# Patient Record
Sex: MALE | Race: WHITE | Employment: OTHER | URBAN - METROPOLITAN AREA
[De-identification: names, ages, dates, MRNs, and addresses within clinical notes are randomized per-mention and may not be internally consistent; named-entity substitution may affect disease eponyms.]

---

## 2021-06-11 ENCOUNTER — APPOINTMENT (OUTPATIENT)
Dept: CT IMAGING | Age: 76
DRG: 063 | End: 2021-06-11
Payer: OTHER GOVERNMENT

## 2021-06-11 ENCOUNTER — HOSPITAL ENCOUNTER (INPATIENT)
Age: 76
LOS: 1 days | Discharge: HOME | DRG: 063 | End: 2021-06-12
Attending: EMERGENCY MEDICINE | Admitting: PSYCHIATRY & NEUROLOGY
Payer: OTHER GOVERNMENT

## 2021-06-11 DIAGNOSIS — I63.9 CEREBROVASCULAR ACCIDENT (CVA), UNSPECIFIED MECHANISM (HCC): ICD-10-CM

## 2021-06-11 DIAGNOSIS — Z92.82 RECEIVED TISSUE PLASMINOGEN ACTIVATOR (T-PA) LESS THAN 24 HOURS PRIOR TO ARRIVAL: Primary | ICD-10-CM

## 2021-06-11 LAB
% CKMB: 2.2 % (ref 0–3.5)
ABSOLUTE EOS #: 0.22 K/UL (ref 0–0.44)
ABSOLUTE IMMATURE GRANULOCYTE: <0.03 K/UL (ref 0–0.3)
ABSOLUTE LYMPH #: 1.13 K/UL (ref 1.1–3.7)
ABSOLUTE MONO #: 0.53 K/UL (ref 0.1–1.2)
ANION GAP SERPL CALCULATED.3IONS-SCNC: 8 MMOL/L (ref 9–17)
BASOPHILS # BLD: 1 % (ref 0–2)
BASOPHILS ABSOLUTE: 0.03 K/UL (ref 0–0.2)
BUN BLDV-MCNC: 19 MG/DL (ref 8–23)
BUN/CREAT BLD: ABNORMAL (ref 9–20)
CALCIUM SERPL-MCNC: 8.6 MG/DL (ref 8.6–10.4)
CHLORIDE BLD-SCNC: 111 MMOL/L (ref 98–107)
CK MB: 2 NG/ML
CKMB INTERPRETATION: ABNORMAL
CO2: 23 MMOL/L (ref 20–31)
CREAT SERPL-MCNC: 0.94 MG/DL (ref 0.7–1.2)
DIFFERENTIAL TYPE: ABNORMAL
EOSINOPHILS RELATIVE PERCENT: 4 % (ref 1–4)
GFR AFRICAN AMERICAN: >60 ML/MIN
GFR NON-AFRICAN AMERICAN: >60 ML/MIN
GFR SERPL CREATININE-BSD FRML MDRD: ABNORMAL ML/MIN/{1.73_M2}
GFR SERPL CREATININE-BSD FRML MDRD: ABNORMAL ML/MIN/{1.73_M2}
GLUCOSE BLD-MCNC: 87 MG/DL (ref 70–99)
HCT VFR BLD CALC: 39.6 % (ref 40.7–50.3)
HEMOGLOBIN: 13 G/DL (ref 13–17)
IMMATURE GRANULOCYTES: 0 %
INR BLD: 1.2
LYMPHOCYTES # BLD: 22 % (ref 24–43)
MCH RBC QN AUTO: 33.1 PG (ref 25.2–33.5)
MCHC RBC AUTO-ENTMCNC: 32.8 G/DL (ref 28.4–34.8)
MCV RBC AUTO: 100.8 FL (ref 82.6–102.9)
MONOCYTES # BLD: 10 % (ref 3–12)
MYOGLOBIN: 46 NG/ML (ref 28–72)
NRBC AUTOMATED: 0 PER 100 WBC
PARTIAL THROMBOPLASTIN TIME: 22.2 SEC (ref 20.5–30.5)
PDW BLD-RTO: 12.8 % (ref 11.8–14.4)
PLATELET # BLD: ABNORMAL K/UL (ref 138–453)
PLATELET ESTIMATE: ABNORMAL
PLATELET, FLUORESCENCE: 117 K/UL (ref 138–453)
PLATELET, IMMATURE FRACTION: 4.9 % (ref 1.1–10.3)
PMV BLD AUTO: ABNORMAL FL (ref 8.1–13.5)
POTASSIUM SERPL-SCNC: 3.6 MMOL/L (ref 3.7–5.3)
PROTHROMBIN TIME: 13 SEC (ref 9.1–12.3)
RBC # BLD: 3.93 M/UL (ref 4.21–5.77)
RBC # BLD: ABNORMAL 10*6/UL
SARS-COV-2, RAPID: NOT DETECTED
SEG NEUTROPHILS: 63 % (ref 36–65)
SEGMENTED NEUTROPHILS ABSOLUTE COUNT: 3.24 K/UL (ref 1.5–8.1)
SODIUM BLD-SCNC: 142 MMOL/L (ref 135–144)
SPECIMEN DESCRIPTION: NORMAL
TOTAL CK: 90 U/L (ref 39–308)
TROPONIN INTERP: ABNORMAL
TROPONIN T: ABNORMAL NG/ML
TROPONIN, HIGH SENSITIVITY: 15 NG/L (ref 0–22)
WBC # BLD: 5.2 K/UL (ref 3.5–11.3)
WBC # BLD: ABNORMAL 10*3/UL

## 2021-06-11 PROCEDURE — 80048 BASIC METABOLIC PNL TOTAL CA: CPT

## 2021-06-11 PROCEDURE — 85055 RETICULATED PLATELET ASSAY: CPT

## 2021-06-11 PROCEDURE — 85027 COMPLETE CBC AUTOMATED: CPT

## 2021-06-11 PROCEDURE — 2580000003 HC RX 258: Performed by: NURSE PRACTITIONER

## 2021-06-11 PROCEDURE — 99283 EMERGENCY DEPT VISIT LOW MDM: CPT

## 2021-06-11 PROCEDURE — 70496 CT ANGIOGRAPHY HEAD: CPT

## 2021-06-11 PROCEDURE — 6370000000 HC RX 637 (ALT 250 FOR IP): Performed by: NURSE PRACTITIONER

## 2021-06-11 PROCEDURE — 84484 ASSAY OF TROPONIN QUANT: CPT

## 2021-06-11 PROCEDURE — 80061 LIPID PANEL: CPT

## 2021-06-11 PROCEDURE — 85730 THROMBOPLASTIN TIME PARTIAL: CPT

## 2021-06-11 PROCEDURE — 70450 CT HEAD/BRAIN W/O DYE: CPT

## 2021-06-11 PROCEDURE — 85025 COMPLETE CBC W/AUTO DIFF WBC: CPT

## 2021-06-11 PROCEDURE — 82565 ASSAY OF CREATININE: CPT

## 2021-06-11 PROCEDURE — 99222 1ST HOSP IP/OBS MODERATE 55: CPT | Performed by: PSYCHIATRY & NEUROLOGY

## 2021-06-11 PROCEDURE — 3E03317 INTRODUCTION OF OTHER THROMBOLYTIC INTO PERIPHERAL VEIN, PERCUTANEOUS APPROACH: ICD-10-PCS | Performed by: PSYCHIATRY & NEUROLOGY

## 2021-06-11 PROCEDURE — 2580000003 HC RX 258: Performed by: STUDENT IN AN ORGANIZED HEALTH CARE EDUCATION/TRAINING PROGRAM

## 2021-06-11 PROCEDURE — 82550 ASSAY OF CK (CPK): CPT

## 2021-06-11 PROCEDURE — 96374 THER/PROPH/DIAG INJ IV PUSH: CPT

## 2021-06-11 PROCEDURE — 2000000003 HC NEURO ICU R&B

## 2021-06-11 PROCEDURE — 83036 HEMOGLOBIN GLYCOSYLATED A1C: CPT

## 2021-06-11 PROCEDURE — 82553 CREATINE MB FRACTION: CPT

## 2021-06-11 PROCEDURE — 93005 ELECTROCARDIOGRAM TRACING: CPT | Performed by: STUDENT IN AN ORGANIZED HEALTH CARE EDUCATION/TRAINING PROGRAM

## 2021-06-11 PROCEDURE — 6360000002 HC RX W HCPCS: Performed by: STUDENT IN AN ORGANIZED HEALTH CARE EDUCATION/TRAINING PROGRAM

## 2021-06-11 PROCEDURE — 87635 SARS-COV-2 COVID-19 AMP PRB: CPT

## 2021-06-11 PROCEDURE — 83874 ASSAY OF MYOGLOBIN: CPT

## 2021-06-11 PROCEDURE — 85610 PROTHROMBIN TIME: CPT

## 2021-06-11 PROCEDURE — 6360000004 HC RX CONTRAST MEDICATION: Performed by: STUDENT IN AN ORGANIZED HEALTH CARE EDUCATION/TRAINING PROGRAM

## 2021-06-11 RX ORDER — ONDANSETRON 2 MG/ML
4 INJECTION INTRAMUSCULAR; INTRAVENOUS EVERY 6 HOURS PRN
Status: DISCONTINUED | OUTPATIENT
Start: 2021-06-11 | End: 2021-06-12 | Stop reason: HOSPADM

## 2021-06-11 RX ORDER — 0.9 % SODIUM CHLORIDE 0.9 %
50 INTRAVENOUS SOLUTION INTRAVENOUS ONCE
Status: COMPLETED | OUTPATIENT
Start: 2021-06-11 | End: 2021-06-11

## 2021-06-11 RX ORDER — ASPIRIN 81 MG/1
81 TABLET, CHEWABLE ORAL DAILY
Status: ON HOLD | COMMUNITY
End: 2021-06-12 | Stop reason: SDUPTHER

## 2021-06-11 RX ORDER — SODIUM CHLORIDE 9 MG/ML
25 INJECTION, SOLUTION INTRAVENOUS PRN
Status: DISCONTINUED | OUTPATIENT
Start: 2021-06-11 | End: 2021-06-12 | Stop reason: HOSPADM

## 2021-06-11 RX ORDER — 0.9 % SODIUM CHLORIDE 0.9 %
1000 INTRAVENOUS SOLUTION INTRAVENOUS ONCE
Status: COMPLETED | OUTPATIENT
Start: 2021-06-11 | End: 2021-06-11

## 2021-06-11 RX ORDER — FINASTERIDE 5 MG/1
5 TABLET, FILM COATED ORAL DAILY
COMMUNITY

## 2021-06-11 RX ORDER — M-VIT,TX,IRON,MINS/CALC/FOLIC 27MG-0.4MG
1 TABLET ORAL DAILY
COMMUNITY

## 2021-06-11 RX ORDER — SODIUM CHLORIDE 9 MG/ML
INJECTION, SOLUTION INTRAVENOUS CONTINUOUS
Status: DISCONTINUED | OUTPATIENT
Start: 2021-06-11 | End: 2021-06-12 | Stop reason: HOSPADM

## 2021-06-11 RX ORDER — ATORVASTATIN CALCIUM 80 MG/1
80 TABLET, FILM COATED ORAL NIGHTLY
Status: DISCONTINUED | OUTPATIENT
Start: 2021-06-11 | End: 2021-06-12 | Stop reason: HOSPADM

## 2021-06-11 RX ORDER — LABETALOL HYDROCHLORIDE 5 MG/ML
10 INJECTION, SOLUTION INTRAVENOUS
Status: DISCONTINUED | OUTPATIENT
Start: 2021-06-11 | End: 2021-06-12 | Stop reason: HOSPADM

## 2021-06-11 RX ORDER — ONDANSETRON 4 MG/1
4 TABLET, ORALLY DISINTEGRATING ORAL EVERY 8 HOURS PRN
Status: DISCONTINUED | OUTPATIENT
Start: 2021-06-11 | End: 2021-06-12 | Stop reason: HOSPADM

## 2021-06-11 RX ORDER — TAMSULOSIN HYDROCHLORIDE 0.4 MG/1
0.4 CAPSULE ORAL 2 TIMES DAILY
COMMUNITY

## 2021-06-11 RX ADMIN — SODIUM CHLORIDE: 9 INJECTION, SOLUTION INTRAVENOUS at 23:34

## 2021-06-11 RX ADMIN — ATORVASTATIN CALCIUM 80 MG: 80 TABLET, FILM COATED ORAL at 23:34

## 2021-06-11 RX ADMIN — SODIUM CHLORIDE 1000 ML: 9 INJECTION, SOLUTION INTRAVENOUS at 16:58

## 2021-06-11 RX ADMIN — SODIUM CHLORIDE 50 ML: 0.9 INJECTION, SOLUTION INTRAVENOUS at 18:30

## 2021-06-11 RX ADMIN — SODIUM CHLORIDE 1000 ML: 9 INJECTION, SOLUTION INTRAVENOUS at 18:10

## 2021-06-11 RX ADMIN — ALTEPLASE 8.4 MG: KIT at 16:56

## 2021-06-11 RX ADMIN — ALTEPLASE 75.8 MG: KIT at 16:57

## 2021-06-11 RX ADMIN — IOPAMIDOL 90 ML: 755 INJECTION, SOLUTION INTRAVENOUS at 17:28

## 2021-06-11 ASSESSMENT — ENCOUNTER SYMPTOMS
ABDOMINAL PAIN: 0
DIARRHEA: 0
SHORTNESS OF BREATH: 0
NAUSEA: 0
COUGH: 0
BACK PAIN: 0
VOMITING: 0
CONSTIPATION: 0

## 2021-06-11 NOTE — ED NOTES
The following labs labeled with pt sticker and tubed:     [x] COVID-19 swab    [x] Rapid       Pt tolerated well. NAD noted.                Indio John RN  06/11/21 1910

## 2021-06-11 NOTE — ED PROVIDER NOTES
Ran Out of Food in the Last Year:    Transportation Needs:     Lack of Transportation (Medical):  Lack of Transportation (Non-Medical):    Physical Activity:     Days of Exercise per Week:     Minutes of Exercise per Session:    Stress:     Feeling of Stress :    Social Connections:     Frequency of Communication with Friends and Family:     Frequency of Social Gatherings with Friends and Family:     Attends Christianity Services:     Active Member of Clubs or Organizations:     Attends Club or Organization Meetings:     Marital Status:    Intimate Partner Violence:     Fear of Current or Ex-Partner:     Emotionally Abused:     Physically Abused:     Sexually Abused:        No family history on file. Allergies:  Tramadol    Home Medications:  Prior to Admission medications    Medication Sig Start Date End Date Taking? Authorizing Provider   tamsulosin (FLOMAX) 0.4 MG capsule Take 0.4 mg by mouth 2 times daily   Yes Historical Provider, MD       REVIEW OFSYSTEMS    (2-9 systems for level 4, 10 or more for level 5)      Review of Systems   Constitutional: Negative for activity change, appetite change, chills, fatigue and fever. HENT: Negative for congestion. Eyes: Negative for visual disturbance. Respiratory: Negative for cough and shortness of breath. Cardiovascular: Negative for chest pain and leg swelling. Gastrointestinal: Negative for abdominal pain, constipation, diarrhea, nausea and vomiting. Musculoskeletal: Negative for back pain. Skin: Negative for rash and wound. Neurological: Positive for speech difficulty. Negative for dizziness, syncope, facial asymmetry, weakness, light-headedness, numbness and headaches.        PHYSICAL EXAM   (up to 7 for level 4, 8 or more forlevel 5)      INITIAL VITALS:   ED Triage Vitals [06/11/21 1718]   BP Temp Temp src Pulse Resp SpO2 Height Weight   124/80 -- -- 76 18 93 % -- 206 lb (93.4 kg)     Vitals:    06/11/21 1718 06/11/21 1731 MDM/Plan/ED COURSE:    77 y.o. male who presents with complaints of dysarthria, aphasia and reported ataxia. Patient arrived via mobile stroke with stroke team at bedside, with TPA running. Patient is well-appearing, nontoxic. Vital signs are within normal limits except for satting 93 to 95% on room air but no acute respiratory distress. Cardiac regular rate and rhythm. Lungs clear to auscultation bilaterally. No drift in upper and lower extremities. Normal sensation. Pupils equal, round, reactive to light, extraocular muscles intact, vision grossly intact. Patient does have dysarthria and expressive aphasia on exam.  No facial droop. No lower extremity swelling or calf tenderness. CT head per mobile stroke unremarkable. Plan for CTA head and neck, stroke panel, EKG and admission to the neuro ICU. Patient admitted to neuro ICU. DIAGNOSTIC RESULTS / EMERGENCYDEPARTMENT COURSE / MDM     LABS:  Labs Reviewed   STROKE PANEL - Abnormal; Notable for the following components:       Result Value    RBC 3.93 (*)     Hematocrit 39.6 (*)     Lymphocytes 22 (*)     Protime 13.0 (*)     All other components within normal limits   IMMATURE PLATELET FRACTION - Abnormal; Notable for the following components:    Platelet, Fluorescence 117 (*)     All other components within normal limits           CT HEAD WO CONTRAST    Result Date: 6/11/2021  EXAMINATION: CT OF THE HEAD WITHOUT CONTRAST  6/11/2021 4:27 pm TECHNIQUE: CT of the head was performed without the administration of intravenous contrast. Dose modulation, iterative reconstruction, and/or weight based adjustment of the mA/kV was utilized to reduce the radiation dose to as low as reasonably achievable. COMPARISON: None.  HISTORY: ORDERING SYSTEM PROVIDED HISTORY: Stroke TECHNOLOGIST PROVIDED HISTORY: Stroke Decision Support Exception - unselect if not a suspected or confirmed emergency medical condition->Emergency Medical Condition (MA) FINDINGS:

## 2021-06-11 NOTE — H&P
Neuro ICU History & Physical    Patient Name: Ryland Isaac  Patient : 1945  Room/Bed:   Allergies: Allergies   Allergen Reactions    Tramadol      \"makes me itch\"     Problem List:   Patient Active Problem List   Diagnosis    Acute ischemic stroke ([facility] [address]       CHIEF COMPLAINT     Dysarthria     HPI    History Obtained From: patient, medical chart and medical staff     The patient is a 77 y.o. male with PMH of HTN, aortic valve replacement, on Metoprolol and ASA 81 mg presented with speech difficulty. Was seen by tele stroke team, the pt stated that he started to have acute onset slurred speech and difficulty in getting the words out. LKW 4 pm. CTH was dose and was neg for acute changes, it showed remote R occipital stroke. SBP was 130. Glucose 168. At bedside, the pt is alert oriented and vitally stable with . Neurological exam reveled expressive aphasia with mild dysarthria with NIHSS of3, STAT CTA  H&N with contrast was unremarkable for LVO. He denied tingling or numbness, weakness, seizures, loss of consciousness, dizziness, headaches, chest or abdomen pain, change in bowel urinary habits, or hallucination. The pt will be admitted to NICU for post tPA precautions. NIH Stroke Scale Total (if not done complete detailed one below):    1a.  Level of consciousness:  0 - alert; keenly responsive  1b. Level of consciousness questions:  0 - answers both questions correctly  1c. Level of consciousness questions:  0 - performs both tasks correctly  2. Best Gaze:  0 - normal  3. Visual:  0 - no visual loss  4. Facial Palsy:  0 - normal symmetric movement  5a. Motor left arm:  0 - no drift, limb holds 90 (or 45) degrees for full 10 seconds  5b. Motor right arm:  0 - no drift, limb holds 90 (or 45) degrees for full 10 seconds  6a. Motor left le - no drift; leg holds 30 degree position for full 5 seconds  6b.   Motor right le - no drift; leg holds 30 degree position for full 5 seconds  7. Limb Ataxia:  0 - absent  8. Sensory:  0 - normal; no sensory loss  9. Best Language:  1 - mild to moderate aphasia; some obvious loss of fluency or facility of comprehension without significant limitation on ideas expressed or form of expression. Reduction of speech and/or comprehension, however, makes conversation about provided materials difficult or impossible. For example, in conversation about provided materials, examiner can identify picture or naming card content from patient's response. 10.  Dysarthria:  1 - mild to moderate, patient slurs at least some words and at worst, can be understood with some difficulty  11. Extinction and Inattention:  0 - no abnormality    Total NIHSS of 2. Admitted to ICU From: ED14  Reason for ICU Admission: post tPA        PATIENT HISTORY   Past Medical History:    No past medical history on file. Past Surgical History:    No past surgical history on file. Social History:   Social History     Socioeconomic History    Marital status: Not on file     Spouse name: Not on file    Number of children: Not on file    Years of education: Not on file    Highest education level: Not on file   Occupational History    Not on file   Tobacco Use    Smoking status: Not on file   Substance and Sexual Activity    Alcohol use: Not on file    Drug use: Not on file    Sexual activity: Not on file   Other Topics Concern    Not on file   Social History Narrative    Not on file     Social Determinants of Health     Financial Resource Strain:     Difficulty of Paying Living Expenses:    Food Insecurity:     Worried About Running Out of Food in the Last Year:     [address] in the Last Year:    Transportation Needs:     Lack of Transportation (Medical):      Lack of Transportation (Non-Medical):    Physical Activity:     Days of Exercise per Week:     Minutes of Exercise per Session:    Stress:     Feeling of Stress :    Social Connections:     Frequency of Communication with Friends and Family:     Frequency of Social Gatherings with Friends and Family:     Attends Mandaeism Services:     Active Member of Clubs or Organizations:     Attends Club or Organization Meetings:     Marital Status:    Intimate Partner Violence:     Fear of Current or Ex-Partner:     Emotionally Abused:     Physically Abused:     Sexually Abused:        Family History:   No family history on file. Allergies:    Tramadol    Medications Prior to Admission:    Not in a hospital admission. Current Medications:  No current facility-administered medications for this encounter.     REVIEW OF SYSTEMS     CONSTITUTIONAL: negative for fatigue and malaise   EYES: negative for double vision and photophobia    HEENT: negative for tinnitus and sore throat   RESPIRATORY: negative for cough, shortness of breath   CARDIOVASCULAR: negative for chest pain, palpitations, or syncope   GASTROINTESTINAL: negative for abdominal pain, nausea, vomiting, diarrhea, or constipation    GENITOURINARY: negative for incontinence or retention    MUSCULOSKELETAL: negative for neck or back pain, negative for extremity pain   NEUROLOGICAL: Slurred speech    PSYCHIATRIC: negative for agitation, hallucination, SI/HI   SKIN Negative for spontaneous contusions, rashes, or lesions      PHYSICAL EXAM:     /80   Pulse 76   Resp 18   Wt 206 lb (93.4 kg)   SpO2 93%     NEUROLOGIC EXAMINATION  GENERAL  Appears comfortable and in no distress   HEENT  NC/ AT   cardiovascular  S1 and S2 heard; palpation of pulses: radial pulse    NECK  Supple and no bruits heard   MENTAL STATUS:  Alert, oriented, intact memory, no confusion, expressive aphasia, mild dysarthria, no hallucination or delusion   CRANIAL NERVES: II     -      PERRL  Visual fields intact to confrontation  III,IV,VI -  EOMs full, no afferent defect, no LORIN, no ptosis  V     -     Normal facial sensation   VII    -     Normal facial symmetry  VIII   -     Intact hearing   IX,X -     Symmetrical palate  XI    -     Symmetrical shoulder shrug  XII   -     Midline tongue, no atrophy    MOTOR FUNCTION:  significant for good strength of grade 5/5 in bilateral proximal and distal muscle groups of both upper and lower extremities with normal bulk, normal tone and no involuntary movements, no tremor   SENSORY FUNCTION:  Normal touch, normal pin, normal vibration, normal proprioception   CEREBELLAR FUNCTION:  Intact fine motor control over upper limbs   REFLEX FUNCTION:  Symmetric, no perverted reflex, no Babinski sign   STATION and GAIT  Not tested         LABS AND IMAGING:     RECENT LABS:  CBC with Differential:  No results found for: WBC, RBC, HGB, HCT, PLT, MCV, MCH, MCHC, RDW, NRBC, SEGSPCT, BANDSPCT, BLASTSPCT, METASPCT, LYMPHOPCT, PROMYELOPCT, MONOPCT, MYELOPCT, EOSPCT, BASOPCT, MONOSABS, LYMPHSABS, EOSABS, BASOSABS, DIFFTYPE  BMP:  No results found for: NA, K, CL, CO2, BUN, LABALBU, CREATININE, CALCIUM, GFRAA, LABGLOM, GLUCOSE    RADIOLOGY:   CT HEAD WO CONTRAST    Result Date: 6/11/2021  EXAMINATION: CT OF THE HEAD WITHOUT CONTRAST  6/11/2021 4:27 pm TECHNIQUE: CT of the head was performed without the administration of intravenous contrast. Dose modulation, iterative reconstruction, and/or weight based adjustment of the mA/kV was utilized to reduce the radiation dose to as low as reasonably achievable. COMPARISON: None. HISTORY: ORDERING SYSTEM PROVIDED HISTORY: Stroke TECHNOLOGIST PROVIDED HISTORY: Stroke Decision Support Exception - unselect if not a suspected or confirmed emergency medical condition->Emergency Medical Condition (MA) FINDINGS: BRAIN/VENTRICLES: There is no acute intracranial hemorrhage, mass effect, or midline shift. There is satisfactory overall gray-white matter differentiation. There is a remote right occipital lobe infarct. The ventricular structures are symmetric and unremarkable.   The infratentorial structures are unremarkable. ORBITS: The visualized portion of the orbits demonstrate no acute abnormality. SINUSES: The visualized paranasal sinuses and mastoid air cells demonstrate no acute abnormality. SOFT TISSUES/SKULL:  No acute abnormality of the visualized skull or soft tissues. No definite acute intracranial abnormality. Remote right occipital lobe infarct. Findings were discussed with TL PARK at 4:44 pm on 6/11/2021. Labs and Images reviewed with:    [] Stephani Jimenez MD    [] Parul Ogden MD  [] Noemí Jones MD  --[] there are no new interval images to review. ASSESSMENT AND PLAN:         ASSESSMENT:     The patient is a 77 y.o. male with PMH of HTN, on Metoprolol and ASA 81 mg presented with dysarthria s/p tPA. Patient care will be discussed with attending, will reevaluate patient along with attending. PLAN/MEDICAL DECISION MAKING:    NEUROLOGIC:  - Imaging CTH neg for acute changes, it showed remote R occipital stroke. -CTA was unremarkable for LVO. - AEDs none   - Goal SBP < 180  - Neuro checks per protocol  -repeat CTH after 24 hours  -MRI of the brain  -Start ASA/Plavix if aaron bleed on images. CARDIOVASCULAR:  - Goal SBP <180  - Continue telemetry  - Echo  - Labetalol 5 mg PRN    PULMONARY:  - Sat well on RA    RENAL/FLUID/ELECTROLYTE:  - KFT wnl  - Monitor Urine output   - IVF: 75 cc/hr  - Replace electrolytes PRN  - Daily BMP    GI/NUTRITION:  NUTRITION:  No diet orders on file  - Bowel regimen: milk of Mag  - GI prophylaxis: none    ID:  - No temp  - WBC wnl  - Continue to monitor for fevers  - Daily CBC    HEME:   - Hemoglobin is 13  - Platelets 683  - Daily CBC    ENDOCRINE:  - Continue to monitor blood glucose, goal <180      OTHER:  - PT/OT/ST   - Code Status: full code     PROPHYLAXIS:  Stress ulcer: none     DVT PROPHYLAXIS:  - SCD sleeves - Thigh High   - CRISTELA stockings - Thigh High  - No chemoprophylaxis anticoagulation at this time.

## 2021-06-11 NOTE — VIRTUAL HEALTH
[address] [facility],4Th Floor Stroke and Vascular Neurology Consult for  [facility] Stroke Unit Stroke Alert through [address] @ 5 pm  6/11/2021 5:07 PM  Pt Name: Higinio Pinto  MRN: [id number]  YOB: 1945  Date of evaluation: 6/11/2021  Primary Care Physician: No primary care provider on file. Reason for Evaluation: Stroke Evaluation with Discussion with Ed or primary team with Telemedicine and stroke evaluation with Review of imaging and labs    Higinio Pinto is a 77 y.o. male who had acute onset speech difficulty/dysarthria. He takes aspirin at home. This started around 4 PM.  No reported falls. His glucose at 168. He was seen at [facility]. He had expressive aphasia anasarca noted. No weakness. No falls. He is not on Coumadin. LKW: 4 PM on 6/11  NIH:  2    Allergies  has no allergies on file. Medications  Prior to Admission medications    Not on File    Scheduled Meds:  Continuous Infusions:  PRN Meds:.  Past Medical History   has no past medical history on file. Social History  Social History     Socioeconomic History    Marital status: Not on file     Spouse name: Not on file    Number of children: Not on file    Years of education: Not on file    Highest education level: Not on file   Occupational History    Not on file   Tobacco Use    Smoking status: Not on file   Substance and Sexual Activity    Alcohol use: Not on file    Drug use: Not on file    Sexual activity: Not on file   Other Topics Concern    Not on file   Social History Narrative    Not on file     Social Determinants of Health     Financial Resource Strain:     Difficulty of Paying Living Expenses:    Food Insecurity:     Worried About Running Out of Food in the Last Year:     [address] in the Last Year:    Transportation Needs:     Lack of Transportation (Medical):      Lack of Transportation (Non-Medical):    Physical Activity:     Days of Exercise per Week:     Minutes of Exercise per Session:    Stress:     Feeling of Stress :    Social Connections:     Frequency of Communication with Friends and Family:     Frequency of Social Gatherings with Friends and Family:     Attends Adventism Services:     Active Member of Clubs or Organizations:     Attends Club or Organization Meetings:     Marital Status:    Intimate Partner Violence:     Fear of Current or Ex-Partner:     Emotionally Abused:     Physically Abused:     Sexually Abused:      Family History  No family history on file. OBJECTIVE  There were no vitals taken for this visit. Imaging:  Images were personally reviewed with VIZ. AI and PACS used to review images including:  CT brain without contrast: No acute bleeding noted. CTA imaging: Pending. Assessment    Differential DDx:  1. Acute ischemic stroke      Recommendations:  1. NIH 2  2. Recommend Inpatient Neurology Consult for further assessment and evaluation   3. Obtain CTA head and neck  4. IV fluids  5. Give TPA    Patient is an IV-tPA candidate:    No history of recent bleeding, no history of brain bleed/aneurysm/brain tumor, or recent surgery/trauma. IV-tPA Consent: I have informed the patient and/or family of all the associated risks including 6% of sich/death and 1-3% of angioedema, benefits, and alternatives to IV t-PA. The patient and/or family voluntarily consent to the administration of IV t-PA. Infuse 0.9 mg/kg (maximum dose 90 mg) over 60 minutes, with 10% of the dose given as a bolus over 1 minute. --Admit the patient to an intensive care or stroke unit for monitoring. --If the patient develops severe headache, acute hypertension, nausea, or vomiting or has a worsening neurological examination, discontinue the infusion (if IV rtPA is being administered) and obtain emergent CT scan.    --Measure blood pressure and perform neurological assessments every 15 minutes during and after IV rtPA infusion for 2 hours, then every 30 minutes for 6 hours, then hourly until 24 hours after IV rtPA treatment. --Increase the frequency of blood pressure measurements if systolic blood pressure is >180 mmHg or if diastolic blood pressure is >105 mmHg; administer antihypertensive medications to maintain blood pressure at or below these levels  --Delay placement of nasogastric tubes, indwelling bladder catheters, or intra-arterial pressure catheters if the patient can be safely managed without them. --Obtain a follow-up CT or MRI scan at 24 hours after IV rtPA before starting anticoagulants or antiplatelet agents. No antiplatelet agent or SQ heparinoids for 24 hrs. --Keep HOB < 15 degrees  --Keep NPO unless passes bedside dysphagia screen or swallow evaluation. --Obtain MRI brain w/o contrast, MRA or CTA of head and neck (carotid u/s if MRA and CTA not available), transthoracic echo-to r/o structural heart disease that can lead to source of emboli, fasting lipid panel, HgbA1c.   --Smoking cessation education and nicotine patch if indicated  --IVF with NS @ 100cc per hour  --PT/OT/SLP      Discussed with Stroke team    At least 20 min of Telemedicine and time in conversation directly with ED staff and physician for the patient who is in imminent and life threatening deterioration without further treatment and evaluation. This Virtual Visit was conducted with patient's (and/or legal guardian's) consent, to provide telestroke consultation and necessary medical care.   Time spent examining patient, reviewing the images personally, reviewing the chart, perform high complexity decision making and speaking with the nursing staff regarding recommendations    Erna Bravo MD, MD   Stroke, Neurocritical Care And/or [address] [place] [address] Stroke [address]  Electronically signed 6/11/2021 at 5:07 PM

## 2021-06-11 NOTE — ED NOTES
..    6/11/2021 6:17 PM    Patient: Stew Simpson, 77 y.o., male Race: White  Patient Address: No address on file. Incident Address:  []Same as patient address     [x] Rosalbajennifer  [] [facility]  [] SOURAV   [] Luanne  [] [facility]  Patient Phone #: There are no phone numbers on file. Insurance: Payor: /   Worker's Comp:  []  Yes   [x]  No  Emergency Contact: No emergency contact information on file. MRN: [id number]  [facility]# [id number]  YOB: 1945  Primary Care Physician: No primary care provider on file. Advance Directive: [x] Full Code   []DNR-CC   []DNR-CCA    MSU Crew: HUGO Paulino - [address] - RN    Pt Transported To:  [x] [place] [address]  [] [facility]  [] [facility]  [] [facility]   [] [facility]  [] [facility]  [facility] [] [facility] [] [facility]    Was patient transported to closest facility? [x]Yes  []No (if No specify reason)   []   Patient/family request    []   Divert to specialist       Response Code   [] 2  [x] 3  []   Change  [] 2  [] 3   Transport Code   [x] 2  [] 3  []   Change  [] 2  [] 3     Mileage:  [facility] 43.   Total Miles 6.9     Call Received 6/11/2021 1602   Dispatched 6/11/2021 1605   Enroute 6/11/2021 1609   Arrived Scene 6/11/2021 1620   At Patient 6/11/2021 1622   Decision to Scan 6/11/2021 1623   Scan 6/11/2021 1629   Departed Scene 6/11/2021 [address] [facility] [address] 6/11/2021 1711   Departed Hospital 6/11/2021 1740   In Service 6/11/2021 1740         Allergies  [] Updated/Reviewed by MSU  [x] Historical Data Only  [] Unavailable  is allergic to tramadol. Medications  [] Updated/Reviewed by MSU  [x] Historical Data Only  [] Unavailable  Prior to Admission medications    Medication Sig Start Date End Date Taking?  Authorizing Provider   tamsulosin (FLOMAX) 0.4 MG capsule Take 0.4 mg by mouth 2 times daily   Yes Historical Provider, MD   aspirin 81 MG chewable tablet Take 81 mg by mouth daily   Yes Historical Provider, MD   Multiple Vitamins-Minerals (THERAPEUTIC MULTIVITAMIN-MINERALS) tablet Take 1 tablet by mouth daily   Yes Historical Provider, MD   metoprolol tartrate (LOPRESSOR) 25 MG tablet Take 37.5 mg by mouth 2 times daily   Yes Historical Provider, MD   finasteride (PROSCAR) 5 MG tablet Take 5 mg by mouth daily   Yes Historical Provider, MD      Past Medical History  [] Updated/Reviewed by MSU  [x] Historical Data Only  [] Unavailable   has no past medical history on file. Patient Weight: 206 lb   []   Estimated   [x]   Stated (per patient)          VITALS          Time BP Pulse   Resp  Rate N-normal  S-shallow  D-deep Monitor SpO2 O2    LPM BGL   Temp Pain   1630 133/74 82 24 N NSR 95 0 168 37 0   1640 116/71 81 22 N NSR 96 0      1650 129/77 73 23 N NSR 95 0      1656 122/79 84 16 N NSR 95 0      1711 131/77 76 16 N NSR 93 0         Pupils GCS   Time R L E  4 V  5 M  6 T  15   1635 2 2 4 5 6 15   1656 2 2 4 5 6 15   1711 2 2 4 5 6 15                         NIH -   record on all transports and Q15 min after tPA administration    NIHSS       Time 1630 1656 1711    1a. LOC - Arousal Status 0 0 0    1b. LOC - Questions 0 0 0    1c. LOC - Commands 0 0 0    2. Eye Movements (gaze) 0 0 0    3. Visual Fields 0 0 0    4. Facial Palsy 0 0 0    5a. Motor Left Arm 0 0 0    5b. Motor Right Arm 0 0 0    6a. Motor Left Leg 0 0 0    6b. Motor Right Leg 0 0 0    7. Limb Ataxia 0 0 0    8. Sensory 0 0 0    9. Language/Aphasia 1 1 1    10. Dysarthria 1 1 1    11. Neglect 0 0 0    TOTAL 2 2 2        Research:    RACE Score: 1 Time: 8728   StrokeVAN Score: Negative Time:1630    Time Last Known Well: 1600 6/11/21    Narrative:    Dispatched to possible CVA per [facility]. Arrived to find [facility], 77 y.o., male c/o new onset difficulty speaking. Report received from [facility] and [place] [facility] at patients side.  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 Upon LS #3 and E19 first assessment, patient was ataxic while getting out of his truck and had slurred speech. First responders also noted that patient had positive orthostatic vitals. Upon MSU arrival, patient was sitting on the back of engine truck. Patient reported that his speech was not normal for himself. Decision was made to scan. Patient was placed in the back of MSU and a full assessment was performed. Patient noted to have slurred speech and at times had difficulty getting his words out, which per patient, is not normal for him. No focal deficits noted with patient arms or legs. No ataxia, sensory or visual disturbances noted. Patient reports he does not take any blood thinners, and provided staff with a medication list. Nishi Anaya at patient side via telemedicine unit. Dr. Nixon Harris also performed full assessment. Due to patient continuing to have trouble with his speech, the decision to give tPA was made. tPA was mixed by Kandis Soliz and checked by [employer]. Bolus dose of 8.4 mg pushed by Nathanael at [id number], drip hung by [address] [place] [address] at 7243. Dr. Nixon Harris also requested 1 liter bolus to be started for patient. Patient received the following medications prior to MSU arrival:none  Patient has the following lines prior to MSU arrival: none  Patient has the following airway placed prior to MSU arrival:None    Patient was transferred to cot via 2 person assist and secured with straps x3. Zoll ECG, SpO2, and NIBP applied and monitored throughout encounter. HOB maintained at 30 degrees. Patient was transferred to ambulance, cot secured in scan position. Non contrast CT scan performed. After scan cot secured in transport position. IV tPA inclusion/exclusion criteria reviewed with patient and physician. Candidate for IV tPA therapy?   [x] Yes   [] No - due to the following exclusion criteria:    [] ICH   [] Taking anticoagulant - oriented x4, no focal deficits noted to the arms or legs. Patient noted to have slurred speech and mild aphasia. Resp: lungs clear to auscultation bilaterally, normal effort  Cardiac: regular rate, no murmur, 12 lead ECG shows NSR  Muscular/skeletal/peripheral vascular: no edema, no redness or tenderness in the calves, pulses present in 4 extremities. Abd/GI/: abd flat, soft, non tender. Skin: Pale, Diaphoretic. IV LINES   Time Size Site # Attempts Performed By   1640 18 R AC 1 HARRY De La Paz   1655 20 L AC 1 HUGO Mcmanus              Total Amount of IV Fluids Infused: 200    MEDS / ELECTRICAL RX   Time Therapy Dosage Route Response Performed By   165 NSR 1,000 mL IV N/A HARRY De La Paz, Medic                                               TPA Administration   Time Bolus Dose Infusion Dose   1656 8.4    1657  75.8       [x] tPA dosing double checked by:  Christopher Parekh Paramedic        ACUTE STROKE DYSPHAGIA SCREEN              YES NO   1 GCS less than 13?         2 Facial Asymmetry or Weakness? 3 Tongue Asymmetry or Weakness? 4 Palatal Asymmetry or Weakness? 5 Signs of aspiration during 3oz water test?*                 If answers to questions 1-4 are NO proceed to 3oz water test               *Administer 3oz of water for sequential drinks, note any throat clearing, cough, or change in vocal quality immediately after and 1 minute following the swallow.                If answers to questions 1-5 are NO proceed with ordered PO meds       POC LABS      TIME      Test (reference range)      FSBG () 168     PT (11-13.5)      INR (0.8-1.1)      Na (138-146)      K (3.5-4.9)      Cl ()      iCa (1.2-1.32)      TCO2 (24-29)      Glu ()      BUN (8.0-26)      Crea (0.6-1.3) 1.2     Hct (38-51)      Hb (12.0-17)      AnGap 10.0-20)                Assistance:   [x]    Fire - LS 3, G54    []    Police    []    Other EMS (See Below)          Hospital Notification:    Christian Castorena 15 -  [x] Delfin Pennington 83  [] [facility] [] TT  [] [email or web address]]  [] [facility] [] [facility] [] [facility] [] [facility]  [] [employer]     [x]    Med Channel:     []    Cell Phone     Med Control Orders Received:   [x] No  []  Yes:     Med Control Physician (if on line medical direction received)  -        Hospital Team Alert:   []    Trauma Alert    []    STEMI Alert         []    Stroke Alert    [x]    RACE Alert      Description Of Valuables: Lamount Abhijit, x2 hearing aides, head set, keys, hat.   Patient Valuables:   [x]    With Patient    []    Not Received    [x]    ER / Lab     Call Outcome:   [x]    Transport to Facility    []    Care Transferred to [facility]    []    Cancelled    []    Patient Refusal    []                           Mobile Stroke Unit    Electronically signed by Ernesto Burnett, RN on 6/11/21 at 6:17 PM EDT     Ernesto Burnett RN  06/11/21 4766

## 2021-06-11 NOTE — ED NOTES
Pt. To the ED via mobile stroke after experiencing stroke like symptoms. Pt. Was at work when he started to experience difficulty w/ speech and states that he just doesn't feel right. Upon arrival pt. Is A&Ox4, resp. Even and non-labored. Slurred speech and expressive aphasia present. Pt. Placed on full cardiac monitor, Dr. Sal Durham at bedside to assess.          Aditi Echeverria RN  06/11/21 3649

## 2021-06-11 NOTE — ED NOTES
Bed: 14  Expected date:   Expected time:   Means of arrival:   Comments:     Elba March RN  06/11/21 2765

## 2021-06-11 NOTE — CONSULTS
abnormality  Total: 2       PAST MEDICAL HISTORY :       Past Medical History:    No past medical history on file. Past Surgical History:    No past surgical history on file. Social History:   Social History     Socioeconomic History    Marital status: Not on file     Spouse name: Not on file    Number of children: Not on file    Years of education: Not on file    Highest education level: Not on file   Occupational History    Not on file   Tobacco Use    Smoking status: Not on file   Substance and Sexual Activity    Alcohol use: Not on file    Drug use: Not on file    Sexual activity: Not on file   Other Topics Concern    Not on file   Social History Narrative    Not on file     Social Determinants of Health     Financial Resource Strain:     Difficulty of Paying Living Expenses:    Food Insecurity:     Worried About Running Out of Food in the Last Year:     [address] in the Last Year:    Transportation Needs:     Lack of Transportation (Medical):  Lack of Transportation (Non-Medical):    Physical Activity:     Days of Exercise per Week:     Minutes of Exercise per Session:    Stress:     Feeling of Stress :    Social Connections:     Frequency of Communication with Friends and Family:     Frequency of Social Gatherings with Friends and Family:     Attends Catholic Services:     Active Member of Clubs or Organizations:     Attends Club or Organization Meetings:     Marital Status:    Intimate Partner Violence:     Fear of Current or Ex-Partner:     Emotionally Abused:     Physically Abused:     Sexually Abused:        Family History:   No family history on file. Allergies:  Tramadol    Home Medications:  Prior to Admission medications    Medication Sig Start Date End Date Taking?  Authorizing Provider   tamsulosin (FLOMAX) 0.4 MG capsule Take 0.4 mg by mouth 2 times daily   Yes Historical Provider, MD   aspirin 81 MG chewable tablet Take 81 mg by mouth daily   Yes Historical Provider, MD   Multiple Vitamins-Minerals (THERAPEUTIC MULTIVITAMIN-MINERALS) tablet Take 1 tablet by mouth daily   Yes Historical Provider, MD   metoprolol tartrate (LOPRESSOR) 25 MG tablet Take 37.5 mg by mouth 2 times daily   Yes Historical Provider, MD   finasteride (PROSCAR) 5 MG tablet Take 5 mg by mouth daily   Yes Historical Provider, MD       Current Medications:   Current Facility-Administered Medications: 0.9 % sodium chloride bolus, 1,000 mL, Intravenous, Once    REVIEW OF SYSTEMS:       CONSTITUTIONAL: negative for fatigue and malaise   EYES: negative for double vision and photophobia    HEENT: negative for tinnitus and sore throat   RESPIRATORY: negative for cough, shortness of breath   CARDIOVASCULAR: negative for chest pain, palpitations   GASTROINTESTINAL: negative for nausea, vomiting   GENITOURINARY: negative for incontinence   MUSCULOSKELETAL: negative for neck or back pain   NEUROLOGICAL: negative for seizures   PSYCHIATRIC: negative for fatigue     Review of systems otherwise negative. PHYSICAL EXAM:       BP (!) 143/82   Pulse 71   Temp 98.3 °F (36.8 °C) (Oral)   Resp 12   Wt 206 lb (93.4 kg)   SpO2 95%     Physical Exam  Vitals and nursing note reviewed. Constitutional:       General: He is not in acute distress. Appearance: He is well-developed. He is not diaphoretic. HENT:      Head: Normocephalic and atraumatic. Right Ear: External ear normal.      Left Ear: External ear normal.   Eyes:      General: No scleral icterus. Right eye: No discharge. Left eye: No discharge. Conjunctiva/sclera: Conjunctivae normal.      Pupils: Pupils are equal, round, and reactive to light. Pulmonary:      Effort: Pulmonary effort is normal.   Abdominal:      General: There is no distension. Palpations: Abdomen is soft. Tenderness: There is no abdominal tenderness. There is no guarding. Musculoskeletal:         General: No tenderness or deformity. Normal range of motion. Cervical back: Normal range of motion. Skin:     General: Skin is warm and dry. Capillary Refill: Capillary refill takes less than 2 seconds. Findings: No erythema or rash. Neurological:      Mental Status: He is alert and oriented to person, place, and time. GCS: GCS eye subscore is 4. GCS verbal subscore is 5. GCS motor subscore is 6. Cranial Nerves: No cranial nerve deficit. Sensory: No sensory deficit. Motor: No abnormal muscle tone or seizure activity. Coordination: Coordination normal.      Deep Tendon Reflexes: Reflexes are normal and symmetric. Reflexes normal.      Reflex Scores:       Tricep reflexes are 2+ on the right side and 2+ on the left side. Bicep reflexes are 2+ on the right side and 2+ on the left side. Brachioradialis reflexes are 2+ on the right side and 2+ on the left side. Patellar reflexes are 2+ on the right side and 2+ on the left side. Achilles reflexes are 2+ on the right side and 2+ on the left side.   Psychiatric:         Behavior: Behavior normal.       Modified [place] Score Scale:     [x] Zero: No symptoms at all   [] 1: No significant disability despite symptoms; able to carry out all usual duties and activities   [] 2: Slight disability; unable to carry out all previous activities, but able to look after own affairs without assistance   [] 3:Moderate disability; requiring some help, but able to walk without assistance   [] 4: Moderately severe disability; unable to walk and attend to bodily needs without assistance   [] 5:Severe disability; bedridden, incontinent and requiring constant nursing care and attention    LABS AND IMAGING:     CBC with Differential:    Lab Results   Component Value Date    WBC 5.2 06/11/2021    RBC 3.93 06/11/2021    HGB 13.0 06/11/2021    HCT 39.6 06/11/2021    PLT See Reflexed IPF Result 06/11/2021    .8 06/11/2021    MCH 33.1 06/11/2021    MCHC 32.8 Lipitor 40mg nightly     - Fasting Lipid panel    - PT, OT, Speech eval     - Hydrate with 1000cc bolus then IVF NS @ 75cc/hr. - Telemetry     - Neuro checks per protocol   - We recommend SBP <180   - Blood glucose goal less than 180   - Please avoid dextrose containing solutions    Additional recommendations may follow    Please contact EV NSG with any changes in patients neurologic status. Thank you for your consult.        Josselin Holman MD, [facility]  PGY-3 Neurology   6/11/2021 at 6:10 PM

## 2021-06-12 ENCOUNTER — APPOINTMENT (OUTPATIENT)
Dept: CT IMAGING | Age: 76
DRG: 063 | End: 2021-06-12
Payer: OTHER GOVERNMENT

## 2021-06-12 ENCOUNTER — APPOINTMENT (OUTPATIENT)
Dept: MRI IMAGING | Age: 76
DRG: 063 | End: 2021-06-12
Payer: OTHER GOVERNMENT

## 2021-06-12 VITALS
OXYGEN SATURATION: 95 % | SYSTOLIC BLOOD PRESSURE: 153 MMHG | TEMPERATURE: 98 F | HEIGHT: 72 IN | HEART RATE: 65 BPM | DIASTOLIC BLOOD PRESSURE: 90 MMHG | RESPIRATION RATE: 20 BRPM | BODY MASS INDEX: 28.5 KG/M2 | WEIGHT: 210.4 LBS

## 2021-06-12 LAB
ANION GAP SERPL CALCULATED.3IONS-SCNC: 7 MMOL/L (ref 9–17)
BUN BLDV-MCNC: 15 MG/DL (ref 8–23)
BUN/CREAT BLD: ABNORMAL (ref 9–20)
CALCIUM SERPL-MCNC: 7.7 MG/DL (ref 8.6–10.4)
CHLORIDE BLD-SCNC: 112 MMOL/L (ref 98–107)
CHOLESTEROL/HDL RATIO: 3.4
CHOLESTEROL: 151 MG/DL
CO2: 22 MMOL/L (ref 20–31)
CREAT SERPL-MCNC: 0.84 MG/DL (ref 0.7–1.2)
EKG ATRIAL RATE: 75 BPM
EKG P AXIS: 45 DEGREES
EKG P-R INTERVAL: 170 MS
EKG Q-T INTERVAL: 382 MS
EKG QRS DURATION: 80 MS
EKG QTC CALCULATION (BAZETT): 426 MS
EKG R AXIS: -12 DEGREES
EKG T AXIS: 46 DEGREES
EKG VENTRICULAR RATE: 75 BPM
GFR AFRICAN AMERICAN: >60 ML/MIN
GFR NON-AFRICAN AMERICAN: >60 ML/MIN
GFR SERPL CREATININE-BSD FRML MDRD: ABNORMAL ML/MIN/{1.73_M2}
GFR SERPL CREATININE-BSD FRML MDRD: ABNORMAL ML/MIN/{1.73_M2}
GLUCOSE BLD-MCNC: 83 MG/DL (ref 70–99)
HCT VFR BLD CALC: 39.3 % (ref 40.7–50.3)
HDLC SERPL-MCNC: 44 MG/DL
HEMOGLOBIN: 12.7 G/DL (ref 13–17)
LDL CHOLESTEROL: 93 MG/DL (ref 0–130)
LV EF: 60 %
LVEF MODALITY: NORMAL
MCH RBC QN AUTO: 33.2 PG (ref 25.2–33.5)
MCHC RBC AUTO-ENTMCNC: 32.3 G/DL (ref 28.4–34.8)
MCV RBC AUTO: 102.6 FL (ref 82.6–102.9)
MRSA, DNA, NASAL: NORMAL
NRBC AUTOMATED: 0 PER 100 WBC
PDW BLD-RTO: 13 % (ref 11.8–14.4)
PLATELET # BLD: ABNORMAL K/UL (ref 138–453)
PLATELET, FLUORESCENCE: 107 K/UL (ref 138–453)
PLATELET, IMMATURE FRACTION: 4.7 % (ref 1.1–10.3)
PMV BLD AUTO: ABNORMAL FL (ref 8.1–13.5)
POTASSIUM SERPL-SCNC: 3.7 MMOL/L (ref 3.7–5.3)
RBC # BLD: 3.83 M/UL (ref 4.21–5.77)
SODIUM BLD-SCNC: 141 MMOL/L (ref 135–144)
SPECIMEN DESCRIPTION: NORMAL
TRIGL SERPL-MCNC: 69 MG/DL
VLDLC SERPL CALC-MCNC: NORMAL MG/DL (ref 1–30)
WBC # BLD: 5.6 K/UL (ref 3.5–11.3)

## 2021-06-12 PROCEDURE — 6360000002 HC RX W HCPCS: Performed by: STUDENT IN AN ORGANIZED HEALTH CARE EDUCATION/TRAINING PROGRAM

## 2021-06-12 PROCEDURE — 93306 TTE W/DOPPLER COMPLETE: CPT

## 2021-06-12 PROCEDURE — 87641 MR-STAPH DNA AMP PROBE: CPT

## 2021-06-12 PROCEDURE — 70450 CT HEAD/BRAIN W/O DYE: CPT

## 2021-06-12 PROCEDURE — 2580000003 HC RX 258: Performed by: NURSE PRACTITIONER

## 2021-06-12 PROCEDURE — 85055 RETICULATED PLATELET ASSAY: CPT

## 2021-06-12 PROCEDURE — 92523 SPEECH SOUND LANG COMPREHEN: CPT

## 2021-06-12 PROCEDURE — 36415 COLL VENOUS BLD VENIPUNCTURE: CPT

## 2021-06-12 PROCEDURE — 97116 GAIT TRAINING THERAPY: CPT

## 2021-06-12 PROCEDURE — 99291 CRITICAL CARE FIRST HOUR: CPT | Performed by: PSYCHIATRY & NEUROLOGY

## 2021-06-12 PROCEDURE — 93010 ELECTROCARDIOGRAM REPORT: CPT | Performed by: INTERNAL MEDICINE

## 2021-06-12 PROCEDURE — 97112 NEUROMUSCULAR REEDUCATION: CPT

## 2021-06-12 PROCEDURE — 70551 MRI BRAIN STEM W/O DYE: CPT

## 2021-06-12 PROCEDURE — 97161 PT EVAL LOW COMPLEX 20 MIN: CPT

## 2021-06-12 RX ORDER — SODIUM CHLORIDE 0.9 % (FLUSH) 0.9 %
5-40 SYRINGE (ML) INJECTION PRN
Status: DISCONTINUED | OUTPATIENT
Start: 2021-06-12 | End: 2021-06-12 | Stop reason: HOSPADM

## 2021-06-12 RX ORDER — CLOPIDOGREL BISULFATE 75 MG/1
75 TABLET ORAL DAILY
Qty: 30 TABLET | Refills: 3 | Status: SHIPPED | OUTPATIENT
Start: 2021-06-12 | End: 2021-06-12 | Stop reason: SDUPTHER

## 2021-06-12 RX ORDER — CLOPIDOGREL BISULFATE 75 MG/1
75 TABLET ORAL DAILY
Qty: 30 TABLET | Refills: 3 | Status: SHIPPED | OUTPATIENT
Start: 2021-06-12

## 2021-06-12 RX ORDER — ASPIRIN 81 MG/1
81 TABLET, CHEWABLE ORAL DAILY
Qty: 30 TABLET | Refills: 0 | Status: SHIPPED | OUTPATIENT
Start: 2021-06-12

## 2021-06-12 RX ORDER — ATORVASTATIN CALCIUM 80 MG/1
80 TABLET, FILM COATED ORAL NIGHTLY
Qty: 30 TABLET | Refills: 3 | Status: SHIPPED | OUTPATIENT
Start: 2021-06-12 | End: 2021-06-12

## 2021-06-12 RX ORDER — ATORVASTATIN CALCIUM 80 MG/1
80 TABLET, FILM COATED ORAL NIGHTLY
Qty: 30 TABLET | Refills: 3 | Status: SHIPPED | OUTPATIENT
Start: 2021-06-12

## 2021-06-12 RX ORDER — SODIUM CHLORIDE 0.9 % (FLUSH) 0.9 %
5-40 SYRINGE (ML) INJECTION EVERY 12 HOURS SCHEDULED
Status: DISCONTINUED | OUTPATIENT
Start: 2021-06-12 | End: 2021-06-12 | Stop reason: HOSPADM

## 2021-06-12 RX ORDER — ASPIRIN 81 MG/1
81 TABLET, CHEWABLE ORAL DAILY
Qty: 30 TABLET | Refills: 0 | Status: SHIPPED | OUTPATIENT
Start: 2021-06-12 | End: 2021-06-12 | Stop reason: SDUPTHER

## 2021-06-12 RX ADMIN — SODIUM CHLORIDE, PRESERVATIVE FREE 10 ML: 5 INJECTION INTRAVENOUS at 11:48

## 2021-06-12 RX ADMIN — CALCIUM GLUCONATE 2000 MG: 20 INJECTION, SOLUTION INTRAVENOUS at 11:44

## 2021-06-12 ASSESSMENT — PAIN SCALES - GENERAL: PAINLEVEL_OUTOF10: 0

## 2021-06-12 NOTE — ED NOTES
Patient's family will be provided Room 7 at [facility] once patient is taken to room.       JANNIE Farris  06/12/21 6083

## 2021-06-12 NOTE — PROGRESS NOTES
Neuro ICU progress note    Patient Name: Tigre Lopes  Patient : 1945  Room/Bed: 0526/0526-01  Allergies: Allergies   Allergen Reactions    Tramadol      \"makes me itch\"     Problem List:   Patient Active Problem List   Diagnosis    Acute ischemic stroke ([facility] [address]       CHIEF COMPLAINT     Dysarthria     HPI    History Obtained From: patient, medical chart and medical staff     The patient is a 77 y.o. male with PMH of HTN, aortic valve replacement, on Metoprolol and ASA 81 mg presented with speech difficulty. Was seen by tele stroke team, the pt stated that he started to have acute onset slurred speech and difficulty in getting the words out. LKW 4 pm. CTH was dose and was neg for acute changes, it showed remote R occipital stroke. SBP was 130. Glucose 168. At bedside, the pt is alert oriented and vitally stable with . Neurological exam reveled expressive aphasia with mild dysarthria with NIHSS of3, STAT CTA  H&N with contrast was unremarkable for LVO. He denied tingling or numbness, weakness, seizures, loss of consciousness, dizziness, headaches, chest or abdomen pain, change in bowel urinary habits, or hallucination. The pt will be admitted to NICU for post tPA precautions. Total NIHSS of 2. Last 24 hours, no acute events overnight, systolic blood pressure ranging between 140 and 160. The patient stated that he had mild improvement in his speech. Pending MRI of the brain and stroke work-up. PATIENT HISTORY   Past Medical History:    No past medical history on file. Past Surgical History:    No past surgical history on file.     Social History:   Social History     Socioeconomic History    Marital status:      Spouse name: Not on file    Number of children: Not on file    Years of education: Not on file    Highest education level: Not on file   Occupational History    Not on file   Tobacco Use    Smoking status: Not on file   Substance and Sexual Activity    Alcohol use: Not on file    Drug use: Not on file    Sexual activity: Not on file   Other Topics Concern    Not on file   Social History Narrative    Not on file     Social Determinants of Health     Financial Resource Strain:     Difficulty of Paying Living Expenses:    Food Insecurity:     Worried About Running Out of Food in the Last Year:     [address] in the Last Year:    Transportation Needs:     Lack of Transportation (Medical):  Lack of Transportation (Non-Medical):    Physical Activity:     Days of Exercise per Week:     Minutes of Exercise per Session:    Stress:     Feeling of Stress :    Social Connections:     Frequency of Communication with Friends and Family:     Frequency of Social Gatherings with Friends and Family:     Attends Hindu Services:     Active Member of Clubs or Organizations:     Attends Club or Organization Meetings:     Marital Status:    Intimate Partner Violence:     Fear of Current or Ex-Partner:     Emotionally Abused:     Physically Abused:     Sexually Abused:        Family History:   No family history on file.     Allergies:    Tramadol    Medications Prior to Admission:    Medications Prior to Admission: tamsulosin (FLOMAX) 0.4 MG capsule, Take 0.4 mg by mouth 2 times daily  aspirin 81 MG chewable tablet, Take 81 mg by mouth daily  Multiple Vitamins-Minerals (THERAPEUTIC MULTIVITAMIN-MINERALS) tablet, Take 1 tablet by mouth daily  metoprolol tartrate (LOPRESSOR) 25 MG tablet, Take 37.5 mg by mouth 2 times daily  finasteride (PROSCAR) 5 MG tablet, Take 5 mg by mouth daily    Current Medications:  Current Facility-Administered Medications: sodium chloride flush 0.9 % injection 5-40 mL, 5-40 mL, Intravenous, 2 times per day  sodium chloride flush 0.9 % injection 5-40 mL, 5-40 mL, Intravenous, PRN  0.9 % sodium chloride infusion, 25 mL, Intravenous, PRN  ondansetron (ZOFRAN-ODT) disintegrating tablet 4 mg, 4 mg, Oral, Q8H PRN **OR** ondansetron Condition (MA) FINDINGS: BRAIN/VENTRICLES: There is no acute intracranial hemorrhage, mass effect, or midline shift. There is satisfactory overall gray-white matter differentiation. There is a remote right occipital lobe infarct. The ventricular structures are symmetric and unremarkable. The infratentorial structures are unremarkable. ORBITS: The visualized portion of the orbits demonstrate no acute abnormality. SINUSES: The visualized paranasal sinuses and mastoid air cells demonstrate no acute abnormality. SOFT TISSUES/SKULL:  No acute abnormality of the visualized skull or soft tissues. No definite acute intracranial abnormality. Remote right occipital lobe infarct. Findings were discussed with TL PARK at 4:44 pm on 6/11/2021. Labs and Images reviewed with:    [] Stephani Holden MD    [] Johnna Paz MD  [] Mathilda Bence, MD  --[] there are no new interval images to review. ASSESSMENT AND PLAN:         ASSESSMENT:     The patient is a 77 y.o. male with PMH of HTN, on Metoprolol and ASA 81 mg presented with dysarthria s/p tPA. Patient care will be discussed with attending, will reevaluate patient along with attending. PLAN/MEDICAL DECISION MAKING:    NEUROLOGIC:  - Imaging CTH neg for acute changes, it showed remote R occipital stroke. -CTA was unremarkable for LVO. - AEDs none   - Goal SBP < 180  - Neuro checks per protocol  -Repeat CTH after 24 hours  -MRI of the brain is negative for acute stroke.  -Start ASA/Plavix if neg bleed on images. CARDIOVASCULAR:  - Goal SBP <180  - Continue telemetry  - Echo pending.  - Labetalol 5 mg PRN    PULMONARY:  - Sat well on RA    RENAL/FLUID/ELECTROLYTE:  - KFT wnl  - Monitor Urine output   - IVF: 100 cc/hr  - Replace electrolytes PRN  - Daily BMP    GI/NUTRITION:  NUTRITION:  ADULT DIET;  Regular  - Bowel regimen: milk of Mag  - GI prophylaxis: none    ID:  - No temp  - WBC wnl  - Continue to monitor for fevers  - Daily CBC HEME:   - Hemoglobin is 12.7  - Platelets 609  - Daily CBC    ENDOCRINE:  - Continue to monitor blood glucose, goal <180      OTHER:  - PT/OT/ST   - Code Status: full code     PROPHYLAXIS:  Stress ulcer: none     DVT PROPHYLAXIS:  - SCD sleeves - Thigh High   - CRISTELA stockings - Thigh High  - No chemoprophylaxis anticoagulation at this time.       DISPOSITION: Magali Rios MD  Neuro Critical Care Service   6/12/2021     7:49 AM

## 2021-06-12 NOTE — FLOWSHEET NOTE
[address] [facility] [address]     Emergency/Trauma Note    PATIENT NAME: Kristina Tucker    Shift date: 6/11/21  Shift day: Friday   Shift # 3    Room # 14/14   Name: Kristina Tucker            Age: 77 y.o. Gender: male          Jehovah's witness: No Sabianism on file   Place of Orthodoxy:     Trauma/Incident type: Stroke Alert  Admit Date & Time: 6/11/2021  5:15 PM  TRAUMA NAME:     ADVANCE DIRECTIVES IN CHART? No    NAME OF DECISION MAKER:     RELATIONSHIP OF DECISION MAKER TO PATIENT:     PATIENT/EVENT DESCRIPTION:  Kristina Tucker is a 77 y.o. male left handed who presents with acute dysarthria around 4 PM today while he was working on connecting his trailer to his vehicle? EMS called and [facility] responding found to have NIH 2 for expressive aphasia and dysarthria. Pt to be admitted to 14/14. SPIRITUAL ASSESSMENT/INTERVENTION:   responded to Stroke Alert. Pt sitting up in bed and talking with slurred speech. Pt requested wife to be called and brother.  completed request and escorted to pt's room.  was bedside support offering prayer and compassionate care during ED Stay. Gratitude was expressed. PATIENT BELONGINGS:  With patient    ANY BELONGINGS OF SIGNIFICANT VALUE NOTED:  N/A    REGISTRATION STAFF NOTIFIED?   Yes    WHAT IS YOUR SPIRITUAL CARE PLAN FOR THIS PATIENT?:  Chaplains are available 24/7 via [employer]       06/11/21 8894   Encounter Summary   Services provided to: Patient   Referral/Consult From: Multi-disciplinary team   Continue Visiting   (6/11/21)   Complexity of Encounter Moderate   Length of Encounter 45 minutes   Spiritual Assessment Completed Yes   Crisis   Type Stroke Alert   Assessment Approachable   Intervention Explored feelings, thoughts, concerns   Outcome Comfort       Electronically signed by William Rodriguez on 6/11/2021 at 11:39 PM.  [address]  [phone]

## 2021-06-12 NOTE — DISCHARGE SUMMARY
[facility]    Discharge Summary     Patient ID: Kenan Fofana  :  1945   MRN: [id number]     ACCOUNT:  [de-identified]   Patient's PCP: No primary care provider on file. Admit Date: 2021   Discharge Date: 2021   Length of Stay: 1  Code Status:  Full Code  Admitting Physician: Deisi Benjamin MD  Discharge Physician: Carmel Carter MD     Active Discharge Diagnoses:       Primary Problem  Expressive Omari Dawnjonathans 373 Problems    Diagnosis Date Noted    Acute ischemic stroke [facility]) [I63.9] 2021       Admission Condition:  fair     Discharged Condition: good    Hospital Stay:       Hospital Course:  Kenan Fofana is a 77 y.o. male who was admitted for the management of   <principal problem not specified> , presented to ER with Cerebrovascular Accident    The patient is a 77 y.o. male with PMH of HTN, aortic valve replacement, on Metoprolol and ASA 81 mg presented with speech difficulty. Was seen by tele stroke team, the pt stated that he started to have acute onset slurred speech and difficulty in getting the words out. LKW 4 pm. CTH was dose and was neg for acute changes, it showed remote R occipital stroke. SBP was 130. Glucose 168. At bedside, the pt is alert oriented and vitally stable with . Neurological exam reveled expressive aphasia with mild dysarthria with NIHSS of3, STAT CTA  H&N with contrast was unremarkable for LVO. He denied tingling or numbness, weakness, seizures, loss of consciousness, dizziness, headaches, chest or abdomen pain, change in bowel urinary habits, or hallucination. The pt was admitted to NICU for post tPA precautions. Total NIHSS of 2.     Last 24 hours, no acute events overnight, systolic blood pressure ranging between 140 and 160. The patient stated that he had mild improvement in his speech.      LDL 93, hemoglobin A1c was pending  Echo showed EF of 60%  MRI of the brain was negative for acute stroke. CTH 24hrs post tPA is negative for bleed    The patient will be discharged on aspirin and Plavix for 21 days then continue Plavix as monotherapy. Start on Lipitor 80 mg. To follow-up with neurology clinic in 2 months, with PCP in 1 week for blood pressure management. Patient is stable from neurological standpoint to be discharged.   Significant therapeutic interventions:   tPA  Lipitor  Aspirin/Plavix    Significant Diagnostic Studies:   Labs / Micro:  CBC:   Lab Results   Component Value Date    WBC 5.6 06/12/2021    RBC 3.83 06/12/2021    HGB 12.7 06/12/2021    HCT 39.3 06/12/2021    .6 06/12/2021    MCH 33.2 06/12/2021    MCHC 32.3 06/12/2021    RDW 13.0 06/12/2021    PLT See Reflexed IPF Result 06/12/2021     BMP:    Lab Results   Component Value Date    GLUCOSE 83 06/12/2021     06/12/2021    K 3.7 06/12/2021     06/12/2021    CO2 22 06/12/2021    ANIONGAP 7 06/12/2021    BUN 15 06/12/2021    CREATININE 0.84 06/12/2021    BUNCRER NOT REPORTED 06/12/2021    CALCIUM 7.7 06/12/2021    LABGLOM >60 06/12/2021    GFRAA >60 06/12/2021    GFR      06/12/2021    GFR NOT REPORTED 06/12/2021     HFP:  No results found for: ALB, PROT  CMP:    Lab Results   Component Value Date    GLUCOSE 83 06/12/2021     06/12/2021    K 3.7 06/12/2021     06/12/2021    CO2 22 06/12/2021    BUN 15 06/12/2021    CREATININE 0.84 06/12/2021    ANIONGAP 7 06/12/2021    LABGLOM >60 06/12/2021    GFRAA >60 06/12/2021    GFR      06/12/2021    GFR NOT REPORTED 06/12/2021    CALCIUM 7.7 06/12/2021     PT/INR:    Lab Results   Component Value Date    PROTIME 13.0 06/11/2021    INR 1.2 06/11/2021     PTT:   Lab Results   Component Value Date    APTT 22.2 06/11/2021     FLP:    Lab Results   Component Value Date    CHOL 151 06/12/2021    TRIG 69 06/12/2021    HDL 44 06/12/2021     U/A:  No results found for: Anika Cisneros [address], [facility]0 [facility], [facility], Waldemar Scott       Radiology:    CT HEAD WO CONTRAST    Result Date: 6/11/2021  EXAMINATION: CT OF THE HEAD WITHOUT CONTRAST  6/11/2021 4:27 pm TECHNIQUE: CT of the head was performed without the administration of intravenous contrast. Dose modulation, iterative reconstruction, and/or weight based adjustment of the mA/kV was utilized to reduce the radiation dose to as low as reasonably achievable. COMPARISON: None. HISTORY: ORDERING SYSTEM PROVIDED HISTORY: Stroke TECHNOLOGIST PROVIDED HISTORY: Stroke Decision Support Exception - unselect if not a suspected or confirmed emergency medical condition->Emergency Medical Condition (MA) FINDINGS: BRAIN/VENTRICLES: There is no acute intracranial hemorrhage, mass effect, or midline shift. There is satisfactory overall gray-white matter differentiation. There is a remote right occipital lobe infarct. The ventricular structures are symmetric and unremarkable. The infratentorial structures are unremarkable. ORBITS: The visualized portion of the orbits demonstrate no acute abnormality. SINUSES: The visualized paranasal sinuses and mastoid air cells demonstrate no acute abnormality. SOFT TISSUES/SKULL:  No acute abnormality of the visualized skull or soft tissues. No definite acute intracranial abnormality. Remote right occipital lobe infarct. Findings were discussed with TL PARK at 4:44 pm on 6/11/2021. CTA HEAD NECK W CONTRAST    Result Date: 6/11/2021  EXAMINATION: CTA OF THE HEAD AND NECK WITH CONTRAST 6/11/2021 5:34 pm: TECHNIQUE: CTA of the head and neck was performed with the administration of intravenous contrast. Multiplanar reformatted images are provided for review. MIP images are provided for review. Stenosis of the internal carotid arteries measured using NASCET criteria.  Dose modulation, iterative reconstruction, and/or weight based adjustment of the mA/kV was utilized to reduce the radiation dose to as low as reasonably achievable. COMPARISON: CT head from today HISTORY: ORDERING SYSTEM PROVIDED HISTORY: cva TECHNOLOGIST PROVIDED HISTORY: cva Decision Support Exception - unselect if not a suspected or confirmed emergency medical condition->Emergency Medical Condition (MA) Reason for Exam: cva FINDINGS: CTA NECK: AORTIC ARCH/ARCH VESSELS: No dissection or arterial injury. No significant stenosis of the brachiocephalic or subclavian arteries. CAROTID ARTERIES: Mixed plaque in the carotid bulb on the right causing less than 50% stenosis by NASCET criteria. Small amount of plaque in the bulb on the left with no stenosis. The common, internal and external carotid arteries are otherwise normal. VERTEBRAL ARTERIES: No dissection, arterial injury, or significant stenosis. SOFT TISSUES: The lung apices are clear. No cervical or superior mediastinal lymphadenopathy. The larynx and pharynx are unremarkable. No acute abnormality of the salivary and thyroid glands. BONES: No acute osseous abnormality. CTA HEAD: ANTERIOR CIRCULATION: No significant stenosis of the intracranial internal carotid, anterior cerebral, or middle cerebral arteries. No aneurysm. POSTERIOR CIRCULATION: No significant stenosis of the vertebral, basilar, or posterior cerebral arteries. No aneurysm. OTHER: No dural venous sinus thrombosis on this non-dedicated study. BRAIN: No mass effect or midline shift. No extra-axial fluid collection. The gray-white differentiation is maintained. Small amount of plaque in the bulbs bilaterally but no significant stenosis.      MRI brain without contrast    Result Date: 6/12/2021  EXAMINATION: MRI OF THE BRAIN WITHOUT CONTRAST  6/12/2021 7:47 am TECHNIQUE: Multiplanar multisequence MRI of the brain was performed without the administration of intravenous contrast. COMPARISON: CT angiogram brain 06/11/2021 HISTORY: ORDERING SYSTEM PROVIDED HISTORY: aphasia, dysarthria; eval for acute stroke TECHNOLOGIST PROVIDED HISTORY: aphasia, dysarthria; eval for acute stroke Decision Support Exception - unselect if not a suspected or confirmed emergency medical condition->Emergency Medical Condition (MA) Reason for Exam: aphasia, eval for stroke, cva FINDINGS: INTRACRANIAL STRUCTURES/VENTRICLES: There are no areas of restricted diffusion identified to suggest an acute infarct. There is no acute intracranial hemorrhage. No mass effect or midline shift is present. There are remote infarcts within the bilateral occipital lobes. There is a remote lacunar infarct within the left basal ganglia. There are multiple foci of abnormal increased T2/FLAIR signal intensity within the periventricular white matter. Several remote micro hemorrhages are noted in both cerebral hemispheres. There is no sellar or suprasellar mass present. There is no ventriculomegaly or abnormal extra-axial fluid collection present. The proximal portions of the [place] demonstrate normal flow voids. ORBITS: Limited evaluation of the orbits is unremarkable. SINUSES: The paranasal sinuses and mastoid air cells are clear. BONES/SOFT TISSUES: Bone marrow signal intensity is normal.     1. No acute infarct or acute intracranial process identified. 2. Remote bilateral occipital lobe infarcts. 3. Remote left basal ganglia infarct. 4. Mild chronic small vessel ischemic changes and several remote micro hemorrhages within the cerebral hemispheres. Consultations:    Consults:     Final Specialist Recommendations/Findings:   None      The patient was seen and examined on day of discharge and this discharge summary is in conjunction with any daily progress note from day of discharge.     Discharge plan:       Disposition: Home    Physician Follow Up:     Adelfo Gtz MD  [address]  [phone]    In 2 months      Diana Rosario MD  [address] [place] [address] [phone]    In 1 week  Blood pressure management       Requiring Further Evaluation/Follow Up POST HOSPITALIZATION/Incidental Findings:     Diet: regular diet    Activity: As tolerated    Instructions to Patient:   1. Return to hospital if symptoms worsen  2. Cont home meds  3. F/u with PCP in 1 week  4. F/u with neurology in 2 months   5. Start aspirin and Plavix for 21 days then Plavix monotherapy. Start Lipitor 80 mg.  6.  Smoking cessation  7. F/u with PCP for CBC and HbA1c    Discussed with the pt and family   Restrictions: Driving No, Swimming No, Operating heavy machinery No, Compromising heights No      Discharge Medications:      Medication List      ASK your doctor about these medications    aspirin 81 MG chewable tablet     finasteride 5 MG tablet  Commonly known as: PROSCAR     metoprolol tartrate 25 MG tablet  Commonly known as: LOPRESSOR     tamsulosin 0.4 MG capsule  Commonly known as: FLOMAX     therapeutic multivitamin-minerals tablet            Time Spent on discharge is  36 mins in patient examination, evaluation, counseling as well as medication reconciliation, prescriptions for required medications, discharge plan and follow up. Discharge process and orders were discussed with attending Dr. Herman Baez MD      Thank you Dr. Faith Tovar primary care provider on file. for the opportunity to be involved in this patient's care.

## 2021-06-12 NOTE — PROGRESS NOTES
Speech Language Pathology  [address]  Initial Speech/Language/Cognitive Assessment    NAME: Richard Dorado  : 1945   MRN: [id number]  ADMISSION DATE: 2021  ADMITTING DIAGNOSIS: has Acute ischemic stroke [facility]) on their problem list.    Date of Eval: 2021   Evaluating Therapist: RAIMUNDO Ferrara    RECENT RESULTS  CT OF HEAD/MRI: 2021    Impression   1. No acute infarct or acute intracranial process identified. 2. Remote bilateral occipital lobe infarcts. 3. Remote left basal ganglia infarct. 4. Mild chronic small vessel ischemic changes and several remote micro   hemorrhages within the cerebral hemispheres. Primary Complaint: The patient is a 77 y.o. male with PMH of HTN, aortic valve replacement, on Metoprolol and ASA 81 mg presented with speech difficulty. Was seen by tele stroke team, the pt stated that he started to have acute onset slurred speech and difficulty in getting the words out. LKW 4 pm. CTH was dose and was neg for acute changes, it showed remote R occipital stroke. SBP was 130. Glucose 168. At bedside, the pt is alert oriented and vitally stable with . Neurological exam reveled expressive aphasia with mild dysarthria with NIHSS of3, STAT CTA  H&N with contrast was unremarkable for LVO. He denied tingling or numbness, weakness, seizures, loss of consciousness, dizziness, headaches, chest or abdomen pain, change in bowel urinary habits, or hallucination. The pt will be admitted to NICU for post tPA precautions. Pain:  Pain Assessment  Pain Assessment: 0-10  Pain Level: 0    Assessment:  Pt presents with no apparent cognitive deficits at this time. No dysarthria noted, no oral motor deficits. Pt occasionally with mildly delayed responses, however pt and family report pt's speech, language, and cognitive skills are at baseline. No further ST is recommended. Verbal education provided.        Recommendations:  Requires SLP Intervention: No  D/C Recommendations: No therapy recommended at discharge. Patient/family involved in developing treatment plan: yes    Subjective:   Previous level of function and limitations:   General  Chart Reviewed: Yes  Family / Caregiver Present: Yes     Vision  Vision: Within Functional Limits  Hearing  Hearing: Within functional limits           Objective:  Oral/Motor  Oral Motor: Within functional limits    Expression  Primary Mode of Expression: Verbal    Motor Speech  Motor Speech: Within Functional Limits      Cognition:   Orientation  Overall Orientation Status: Within Normal Limits  Memory  Memory: Within Funtional Limits (Delayed recall, 3 units: 2/3, 3/3; Immediate recall, 3 units: 3/3, 3/3 5 units: 4/5)  Problem Solving  Problem Solving: Within Functional Limits  Abstract Reasoning  Abstract Reasoning: Within Functional Limits  Safety/Judgement  Safety/Judgement: Within Functional Limits  Verbal Sequencing: WFL  Word Associations: WFL    Prognosis:  Speech Therapy Prognosis  Prognosis: Good  Individuals consulted  Consulted and agree with results and recommendations: Patient; Family member  Family member consulted: Pt's younger brother    Education:  Patient Education: yes  Patient Education Response: Verbalizes understanding          Therapy Time:   Individual Concurrent Group Co-treatment   Time In [phone]         Time Out 0854         Minutes 19 RAIMUNDO Herr  6/12/2021 9:00 AM

## 2021-06-12 NOTE — PROGRESS NOTES
Restrictions  Position Activity Restriction  Other position/activity restrictions: Up with assist.  Received tPa. Had right shoulder surgery last fall. Vision/Hearing  Vision: Within Functional Limits (He was able to read the time on a clock in the room.)  Hearing: Within functional limits     Subjective  General  Chart Reviewed: Yes  Family / Caregiver Present: Yes (wife and brother)  Follows Commands: Within Functional Limits  Pain Screening  Patient Currently in Pain: Denies  Vital Signs  Patient Currently in Pain: Denies       Orientation  Orientation  Overall Orientation Status: Within Functional Limits  Social/Functional History  Social/Functional History  Lives With: Spouse  Type of Home: House  Home Layout: One level, Work area in basement  Home Access: Stairs to enter with rails  Entrance Stairs - Number of Steps: 4  ADL Assistance: Independent  Homemaking Assistance: Independent  Ambulation Assistance: Independent  Transfer Assistance: Independent  Active : Yes  Occupation: Full time employment  Type of occupation:   Leisure & Hobbies: Restoring a car  Additional Comments: Independent prior level of function. Patient's wife denies that he's had any recent falls. No prior assistive device use. Cognition   Cognition  Overall Cognitive Status: WFL    Objective             Strength RLE  R Hip Flexion: 4+/5  R Hip Extension: 4+/5  R Ankle Dorsiflexion: 4+/5  R Ankle Plantar flexion: 4+/5  Strength LLE  L Hip Flexion: 4+/5  L Hip Extension: 4+/5  L Ankle Dorsiflexion: 4+/5  L Ankle Plantar Flexion: 4+/5     Sensation  Overall Sensation Status: WFL  Bed mobility  Supine to Sit: Supervision  Sit to Supine: Supervision  Transfers  Sit to Stand: Stand by assistance  Stand to sit: Stand by assistance  Comment: Able to rise to stand with arms across chest, legs only.   Ambulation  Ambulation?: Yes  Ambulation 1  Surface: level tile  Device: No Device  Assistance: Contact guard assistance  Quality of Minutes: Shelia, GIORGI

## 2021-06-12 NOTE — PROGRESS NOTES
Patient discharged home with wife and brother. All instructions discussed with patient and his wife. All questions answered.

## 2021-06-13 LAB
ESTIMATED AVERAGE GLUCOSE: 105 MG/DL
HBA1C MFR BLD: 5.3 % (ref 4–6)

## 2021-06-14 ENCOUNTER — CARE COORDINATION (OUTPATIENT)
Dept: CASE MANAGEMENT | Age: 76
End: 2021-06-14

## 2021-06-14 LAB — POC CREATININE: 1.2 MG/DL (ref 0.6–1.4)

## 2021-06-14 NOTE — CARE COORDINATION
[facility] Transitions Initial Follow Up Call    Call within 2 business days of discharge: Yes    Patient: Shefali Villalobos   Patient : 1945   MRN: [id number]    Reason for Admission: CVA - TPA  Discharge Date: 21   RARS: Readmission Risk Score: 10      Last Discharge [facility]       Complaint Diagnosis Description Type Department Provider    21 Cerebrovascular Accident Received tissue plasminogen activator (t-PA) less than 24 hours prior to arrival ... ED to Hosp-Admission (Discharged) (ADMITTED) Ly Syed MD; Josué Dean MD           Spoke with: patient & spouse. Patient denies any further symptoms - says doing well post CVA/TPA. Patient said his speech is almost back to normal - had expressive asphagia with his stroke initially. Patient is following up with his [place] PCP in [facility] & was in [facility] when stroke symptoms started. He's back home [place] now & plans to f/u there - that office is obtaining all hospital records. Patient has CTN contact # if any further questions. Patient & spouse understand the instructions of ASA, Plavix, Lipitor. Denies any questions or concerns. One time transition call since patient is followed by [facility] PCP out of state.       Non-face-to-face services provided:  [facility] [address] [place] [address]  Scheduled appointment with Specialist-Neuro  Obtained and reviewed discharge summary and/or continuity of care documents      Care Transitions 24 Hour Call    Do you have any ongoing symptoms?: No  Do you have a copy of your discharge instructions?: Yes  Do you have all of your prescriptions and are they filled?: Yes  Have you been contacted by a [address] [employer] Pharmacist?: No  Have you scheduled your follow up appointment?: Yes  How are you going to get to your appointment?: Car - family or friend to transport  Were you discharged with any Home Care or Post Acute Services: No  Do you feel like you have everything you need to keep you well at home?: Yes  Care Transitions Interventions             Was this an external facility discharge? No     Challenges to be reviewed by the provider   Additional needs identified to be addressed with provider: No  none             Method of communication with provider : none      Advance Care Planning:   Does patient have an Advance Directive:  reviewed and current. Was this a readmission? No  Patient stated reason for admission: CVA - TPA  Patients top risk factors for readmission: medical condition-CVA, medication management and multiple health system providers    Care Transition Nurse (CTN) contacted the patient by telephone to perform post hospital discharge assessment. Verified name and  with patient as identifiers. Provided introduction to self, and explanation of the CTN role. CTN reviewed discharge instructions, medical action plan and red flags with patient who verbalized understanding. Patient given an opportunity to ask questions and does not have any further questions or concerns at this time. Were discharge instructions available to patient? Yes. Reviewed appropriate site of care based on symptoms and resources available to patient including: PCP and CTN. The patient agrees to contact the PCP office for questions related to their healthcare. Medication reconciliation was performed with patient, who verbalizes understanding of administration of home medications. Covid Risk Education negative covid testing  - fully vaccinated     Educated patient about risk for severe COVID-19 due to risk factors according to [facility] guidelines. CTN reviewed discharge instructions, medical action plan and red flag symptoms with the patient who verbalized understanding. Discussed COVID vaccination status: Yes. Education provided on COVID-19 vaccination as appropriate. Discussed exposure protocols and quarantine with CDC Guidelines.  Patient was given an opportunity to verbalize any questions and concerns and agrees to contact CTN or health care provider for questions related to their healthcare. Reviewed and educated patient on any new and changed medications related to discharge diagnosis. Was patient discharged with a pulse oximeter? No     CTN provided contact information.    No further CTN call - patient has [place] PCP [place] at his home & is following up with PCP & neuro in [place] or [address] [place] [address] PCP    Buzz Welch RN

## 2021-06-14 NOTE — CARE COORDINATION
..    Stroke Follow up Phone Call    Chuck this is Laatnya Carlin from [facility] stroke team calling to see how you are doing since your d/c. Medication List      START taking these medications    atorvastatin 80 MG tablet  Commonly known as: LIPITOR  Take 1 tablet by mouth nightly     clopidogrel 75 MG tablet  Commonly known as: Plavix  Take 1 tablet by mouth daily        CONTINUE taking these medications    aspirin 81 MG chewable tablet  Take 1 tablet by mouth daily     finasteride 5 MG tablet  Commonly known as: PROSCAR     metoprolol tartrate 25 MG tablet  Commonly known as: LOPRESSOR     tamsulosin 0.4 MG capsule  Commonly known as: FLOMAX     therapeutic multivitamin-minerals tablet           Where to Get Your Medications      You can get these medications from any pharmacy    Bring a paper prescription for each of these medications  · aspirin 81 MG chewable tablet  · atorvastatin 80 MG tablet  · clopidogrel 75 MG tablet         Can you tell me what medications you are taking? [x]   Yes  []   No    Do you have any questions about your medications? []   Yes  [x]   No    All medications reviewed with patient   []  Pt provided Pharmacist contact information [phone]     Do you have a follow up apt. With the neurologist?   []   Yes  [x]   No  Provided number to [facility] [phone]    Do you now your risk factors for stroke? [x]   Yes  []   No    Can you tell me the signs and symptoms of stroke? [x]   Yes  []   No  FAST instructions provided    Do if you know what to do if you were having signs/symptoms of stroke? [x]   Yes  []   No  Instructions to call 911 provided    Our goal is to provide the very best stroke care, on a scale of 1 to 10 with 10 as the very best who would you rank the care you received? 10  Pt reports he was very pleased with the care he received by the providers and stroke team while he was in the hospital.         What can we do better?  None Electronically signed by Julee Wiseman RN on 6/14/21 at 11:51 AM EDT

## 2023-10-18 NOTE — PLAN OF CARE
Problem: Falls - Risk of:  Goal: Will remain free from falls  Description: Will remain free from falls  Outcome: Ongoing  Goal: Absence of physical injury  Description: Absence of physical injury  Outcome: Ongoing     Problem: HEMODYNAMIC STATUS  Goal: Patient has stable vital signs and fluid balance  Outcome: Ongoing     Problem: ACTIVITY INTOLERANCE/IMPAIRED MOBILITY  Goal: Mobility/activity is maintained at optimum level for patient  Outcome: Ongoing     Problem: COMMUNICATION IMPAIRMENT  Goal: Ability to express needs and understand communication  Outcome: Ongoing     Problem: Infection:  Goal: Will remain free from infection  Description: Will remain free from infection  Outcome: Ongoing     Problem: Safety:  Goal: Free from accidental physical injury  Description: Free from accidental physical injury  Outcome: Ongoing  Goal: Free from intentional harm  Description: Free from intentional harm  Outcome: Ongoing     Problem: Daily Care:  Goal: Daily care needs are met  Description: Daily care needs are met  Outcome: Ongoing     Problem: Pain:  Goal: Patient's pain/discomfort is manageable  Description: Patient's pain/discomfort is manageable  Outcome: Ongoing     Problem: Skin Integrity:  Goal: Skin integrity will stabilize  Description: Skin integrity will stabilize  Outcome: Ongoing
verbal cues/nonverbal cues (demo/gestures)/1 person assist